# Patient Record
Sex: FEMALE | Race: WHITE | NOT HISPANIC OR LATINO | URBAN - METROPOLITAN AREA
[De-identification: names, ages, dates, MRNs, and addresses within clinical notes are randomized per-mention and may not be internally consistent; named-entity substitution may affect disease eponyms.]

---

## 2022-06-28 RX ORDER — ATORVASTATIN CALCIUM 10 MG/1
TABLET, FILM COATED ORAL
COMMUNITY

## 2022-06-28 RX ORDER — AMITRIPTYLINE HYDROCHLORIDE 25 MG/1
TABLET, FILM COATED ORAL
COMMUNITY

## 2022-06-28 RX ORDER — ESTRADIOL 0.1 MG/G
CREAM VAGINAL
COMMUNITY

## 2023-04-02 LAB
AMORPHOUS CRYSTALS, URINE: NORMAL /HPF
BACTERIA, URINE: ABNORMAL /HPF
BACTERIA, URINE: NORMAL /HPF
BROAD CASTS, URINE: NORMAL /LPF
BUDDING YEAST, URINE: NORMAL /HPF
BUDDING YEAST, URINE: PRESENT /HPF
CALCIUM CARBONATE CRYSTALS, URINE: NORMAL /HPF
CALCIUM OXALATE CRYSTALS, URINE: NORMAL /HPF
CALCIUM PHOSPHATE CRYSTALS, URINE: NORMAL /HPF
CYSTINE CRYSTALS, URINE: NORMAL /HPF
EPITHELIAL CASTS, URINE: NORMAL /LPF
FAT, URINE: NORMAL /HPF
FATTY CASTS, URINE: NORMAL /LPF
FINE GRANULAR CASTS, URINE: NORMAL /LPF
HYALINE CASTS, URINE: ABNORMAL /LPF
HYALINE CASTS, URINE: NORMAL /LPF
LEUCINE CRYSTALS, URINE: NORMAL /HPF
MIXED CELLULAR CASTS, URINE: NORMAL /LPF
MUCUS, URINE: ABNORMAL /LPF
MUCUS, URINE: NORMAL /LPF
OVAL FAT BODIES, URINE: NORMAL /HPF
RBC CASTS, URINE: NORMAL /LPF
RBC CLUMPS, URINE: NORMAL /HPF
RBC, URINE: 2 /HPF (ref 0–5)
RBC, URINE: NORMAL
RENAL EPITHELIAL CELLS, URINE: <1 /HPF
RENAL EPITHELIAL CELLS, URINE: NORMAL /HPF
SPERMATOZOA, URINE: NORMAL /HPF
SQUAMOUS EPITHELIAL CELLS, URINE: <1 /HPF
SQUAMOUS EPITHELIAL CELLS, URINE: NORMAL /HPF
TRANSITIONAL EPITHELIAL CELLS, URINE: NORMAL /HPF
TRICHOMONAS, URINE: NORMAL /HPF
TRIPLE PHOSPHATE CRYSTALS, URINE: NORMAL /HPF
TYROSINE CRYSTALS, URINE: NORMAL /HPF
URIC ACID (URATE) CRYSTALS, URINE: NORMAL /HPF
URINALYSIS MICROSCOPIC COMMENT: NORMAL
WAXY CASTS, URINE: NORMAL /LPF
WBC CASTS, URINE: NORMAL /LPF
WBC CLUMPS, URINE: ABNORMAL /HPF
WBC CLUMPS, URINE: NORMAL /HPF
WBC, URINE: 45 /HPF (ref 0–5)
WBC, URINE: NORMAL
YEAST HYPHAE, URINE: NORMAL /HPF

## 2023-04-05 LAB
BACTERIA, URINE: ABNORMAL /HPF
MUCUS, URINE: ABNORMAL /LPF
RBC, URINE: 1 /HPF (ref 0–5)
SQUAMOUS EPITHELIAL CELLS, URINE: 3 /HPF
URINE CULTURE: ABNORMAL
WBC CLUMPS, URINE: ABNORMAL /HPF
WBC, URINE: 18 /HPF (ref 0–5)

## 2023-04-07 LAB — URINE CULTURE: ABNORMAL

## 2023-06-30 LAB
RBC, URINE: 1 /HPF (ref 0–5)
SQUAMOUS EPITHELIAL CELLS, URINE: 1 /HPF
WBC, URINE: 1 /HPF (ref 0–5)

## 2023-07-01 LAB — URINE CULTURE: NORMAL

## 2023-09-02 LAB
RBC, URINE: <1 /HPF (ref 0–5)
SQUAMOUS EPITHELIAL CELLS, URINE: <1 /HPF
WBC, URINE: <1 /HPF (ref 0–5)

## 2023-09-03 LAB — URINE CULTURE: NORMAL

## 2023-12-18 ENCOUNTER — LAB (OUTPATIENT)
Dept: LAB | Facility: LAB | Age: 73
End: 2023-12-18
Payer: MEDICARE

## 2023-12-18 ENCOUNTER — TELEPHONE (OUTPATIENT)
Dept: UROLOGY | Facility: HOSPITAL | Age: 73
End: 2023-12-18

## 2023-12-18 DIAGNOSIS — N39.0 RECURRENT UTI: Primary | ICD-10-CM

## 2023-12-18 DIAGNOSIS — N39.0 RECURRENT UTI: ICD-10-CM

## 2023-12-18 PROCEDURE — 81003 URINALYSIS AUTO W/O SCOPE: CPT

## 2023-12-19 LAB
APPEARANCE UR: CLEAR
BILIRUB UR STRIP.AUTO-MCNC: NEGATIVE MG/DL
COLOR UR: NORMAL
GLUCOSE UR STRIP.AUTO-MCNC: NEGATIVE MG/DL
HOLD SPECIMEN: NORMAL
KETONES UR STRIP.AUTO-MCNC: NEGATIVE MG/DL
LEUKOCYTE ESTERASE UR QL STRIP.AUTO: NEGATIVE
NITRITE UR QL STRIP.AUTO: NEGATIVE
PH UR STRIP.AUTO: 6 [PH]
PROT UR STRIP.AUTO-MCNC: NEGATIVE MG/DL
RBC # UR STRIP.AUTO: NEGATIVE /UL
SP GR UR STRIP.AUTO: 1
UROBILINOGEN UR STRIP.AUTO-MCNC: <2 MG/DL

## 2023-12-21 DIAGNOSIS — R39.9 UTI SYMPTOMS: Primary | ICD-10-CM

## 2023-12-22 ENCOUNTER — TELEPHONE (OUTPATIENT)
Dept: UROLOGY | Facility: CLINIC | Age: 73
End: 2023-12-22

## 2023-12-22 ENCOUNTER — LAB (OUTPATIENT)
Dept: LAB | Facility: LAB | Age: 73
End: 2023-12-22
Payer: MEDICARE

## 2023-12-22 DIAGNOSIS — N30.00 ACUTE CYSTITIS WITHOUT HEMATURIA: Primary | ICD-10-CM

## 2023-12-22 DIAGNOSIS — R39.9 UTI SYMPTOMS: ICD-10-CM

## 2023-12-22 PROCEDURE — 87086 URINE CULTURE/COLONY COUNT: CPT

## 2023-12-22 RX ORDER — CIPROFLOXACIN 500 MG/1
500 TABLET ORAL 2 TIMES DAILY
Qty: 14 TABLET | Refills: 0 | Status: SHIPPED | OUTPATIENT
Start: 2023-12-22 | End: 2023-12-29

## 2023-12-22 NOTE — TELEPHONE ENCOUNTER
Pt left message stating she dropped off a urine today because she is symptomatic and is requesting 5-7 days of Cipro be sent in as that is what usually works the best for her, thanks,

## 2023-12-23 LAB — BACTERIA UR CULT: NO GROWTH

## 2024-01-11 DIAGNOSIS — N39.0 RECURRENT UTI: Primary | ICD-10-CM

## 2024-02-07 ENCOUNTER — LAB (OUTPATIENT)
Dept: LAB | Facility: LAB | Age: 74
End: 2024-02-07
Payer: MEDICARE

## 2024-02-07 DIAGNOSIS — N39.0 RECURRENT UTI: ICD-10-CM

## 2024-02-07 LAB
APPEARANCE UR: CLEAR
BILIRUB UR STRIP.AUTO-MCNC: NEGATIVE MG/DL
COLOR UR: NORMAL
GLUCOSE UR STRIP.AUTO-MCNC: NORMAL MG/DL
HOLD SPECIMEN: NORMAL
KETONES UR STRIP.AUTO-MCNC: NEGATIVE MG/DL
LEUKOCYTE ESTERASE UR QL STRIP.AUTO: NEGATIVE
NITRITE UR QL STRIP.AUTO: NEGATIVE
PH UR STRIP.AUTO: 5.5 [PH]
PROT UR STRIP.AUTO-MCNC: NEGATIVE MG/DL
RBC # UR STRIP.AUTO: NEGATIVE /UL
SP GR UR STRIP.AUTO: 1.01
UROBILINOGEN UR STRIP.AUTO-MCNC: NORMAL MG/DL

## 2024-02-07 PROCEDURE — 81003 URINALYSIS AUTO W/O SCOPE: CPT

## 2024-02-12 ENCOUNTER — LAB (OUTPATIENT)
Dept: LAB | Facility: LAB | Age: 74
End: 2024-02-12
Payer: MEDICARE

## 2024-02-12 DIAGNOSIS — R30.0 DYSURIA: Primary | ICD-10-CM

## 2024-02-12 DIAGNOSIS — R10.2 PELVIC PAIN: Primary | ICD-10-CM

## 2024-02-12 DIAGNOSIS — R30.0 DYSURIA: ICD-10-CM

## 2024-02-12 LAB
APPEARANCE UR: CLEAR
BILIRUB UR STRIP.AUTO-MCNC: NEGATIVE MG/DL
COLOR UR: COLORLESS
GLUCOSE UR STRIP.AUTO-MCNC: NORMAL MG/DL
HOLD SPECIMEN: NORMAL
KETONES UR STRIP.AUTO-MCNC: NEGATIVE MG/DL
LEUKOCYTE ESTERASE UR QL STRIP.AUTO: NEGATIVE
NITRITE UR QL STRIP.AUTO: NEGATIVE
PH UR STRIP.AUTO: 5 [PH]
PROT UR STRIP.AUTO-MCNC: NEGATIVE MG/DL
RBC # UR STRIP.AUTO: NEGATIVE /UL
SP GR UR STRIP.AUTO: 1.01
UROBILINOGEN UR STRIP.AUTO-MCNC: NORMAL MG/DL

## 2024-02-12 PROCEDURE — 81003 URINALYSIS AUTO W/O SCOPE: CPT

## 2024-02-12 RX ORDER — AMITRIPTYLINE HYDROCHLORIDE 10 MG/1
30 TABLET, FILM COATED ORAL NIGHTLY
Qty: 90 TABLET | Refills: 5 | OUTPATIENT
Start: 2024-02-12 | End: 2024-08-10

## 2024-03-07 ENCOUNTER — LAB (OUTPATIENT)
Dept: LAB | Facility: LAB | Age: 74
End: 2024-03-07
Payer: MEDICARE

## 2024-03-07 DIAGNOSIS — N39.0 RECURRENT UTI: ICD-10-CM

## 2024-03-07 LAB
APPEARANCE UR: CLEAR
BILIRUB UR STRIP.AUTO-MCNC: NEGATIVE MG/DL
COLOR UR: COLORLESS
GLUCOSE UR STRIP.AUTO-MCNC: NORMAL MG/DL
HOLD SPECIMEN: NORMAL
KETONES UR STRIP.AUTO-MCNC: NEGATIVE MG/DL
LEUKOCYTE ESTERASE UR QL STRIP.AUTO: ABNORMAL
MUCOUS THREADS #/AREA URNS AUTO: NORMAL /LPF
NITRITE UR QL STRIP.AUTO: NEGATIVE
PH UR STRIP.AUTO: 5 [PH]
PROT UR STRIP.AUTO-MCNC: NEGATIVE MG/DL
RBC # UR STRIP.AUTO: NEGATIVE /UL
RBC #/AREA URNS AUTO: NORMAL /HPF
SP GR UR STRIP.AUTO: 1.01
SQUAMOUS #/AREA URNS AUTO: NORMAL /HPF
UROBILINOGEN UR STRIP.AUTO-MCNC: NORMAL MG/DL
WBC #/AREA URNS AUTO: NORMAL /HPF

## 2024-03-07 PROCEDURE — 81001 URINALYSIS AUTO W/SCOPE: CPT

## 2024-03-07 PROCEDURE — 87086 URINE CULTURE/COLONY COUNT: CPT

## 2024-03-08 LAB — BACTERIA UR CULT: NO GROWTH

## 2024-07-23 DIAGNOSIS — N30.00 ACUTE CYSTITIS WITHOUT HEMATURIA: Primary | ICD-10-CM

## 2024-07-23 RX ORDER — GRANULES FOR ORAL 3 G/1
3 POWDER ORAL
Qty: 6 G | Refills: 0 | Status: SHIPPED | OUTPATIENT
Start: 2024-07-23 | End: 2024-07-27

## 2024-07-25 ENCOUNTER — APPOINTMENT (OUTPATIENT)
Dept: UROLOGY | Facility: CLINIC | Age: 74
End: 2024-07-25
Payer: MEDICARE

## 2024-08-28 NOTE — PROGRESS NOTES
HISTORY OF PRESENT ILLNESS:  This is a  74 y.o. female, No obstetric history on file. who presents for follow-up for recurrent UTIs.  Patient has had 2 urinary tract infections documented in [place] in April and another 1 in July here at the clinic.  She was on methenamine through breath however she stopped it after having COVID.  Currently she is only taking amitriptyline 1 to 2 tablets at bedtime for her dysuria symptoms.  She has not had urinary retention.  She voids every 2 hours sometimes 3 during the day and she wakes up 1-2 times at night.  She has not been taking d-mannose.      9/1/23 extract from Doris's note  73y  female with history of urinary retention and recurrent UTIs managed with Elavil and PFPT presents today for an evaluation of chronic UTIs. Renewed standing culture order. Continue with amitriptyline and Azo as needed as well as estradiol. Recommended restarting d-mannose 2 g p.o. daily in an effort for further prevention. Patient continues to develop infections, 1 more culture proven by April 2024, I would recommend initiation of daily antibiotics for 6 months. Patient verbalized understanding of plan and is in agreement. Plan for 6-month follow-up or sooner if needed.           PHYSICAL EXAMINATION:  No LMP recorded.  There is no height or weight on file to calculate BMI.  There were no vitals taken for this visit.  General Appearance: well appearing  Neuro: Alert and oriented     Urine dip: No results found for this or any previous visit (from the past 6 hour(s)).  Results for orders placed or performed in visit on 08/29/24 (from the past 96 hour(s))   POCT UA Automated manually resulted   Result Value Ref Range    POC Color, Urine Yellow Straw, Yellow, Light-Yellow    POC Appearance, Urine Clear Clear    POC Glucose, Urine NEGATIVE NEGATIVE mg/dl    POC Bilirubin, Urine NEGATIVE NEGATIVE    POC Ketones, Urine NEGATIVE NEGATIVE mg/dl    POC Specific Gravity, Urine 1.010 1.005 -  1.035    POC Blood, Urine NEGATIVE NEGATIVE    POC PH, Urine 5.5 No Reference Range Established PH    POC Protein, Urine NEGATIVE NEGATIVE, 30 (1+) mg/dl    POC Urobilinogen, Urine 0.2 0.2, 1.0 EU/DL    Poc Nitrite, Urine NEGATIVE NEGATIVE    POC Leukocytes, Urine NEGATIVE NEGATIVE        IMPRESSION AND PLAN:  Sherly Tamayo is a 74 y.o. No obstetric history on file. who presents with recurrent UTIs.  She had 2 interval urinary tract infections since [facility] of last year.  She was on methenamine however this was stopped after she developed COVID.  She has not been taking d-mannose.  We did talk today about resuming those suppressive therapies for prevention of infection.  We will place a standing order for urine culture in her system.  She will have a refill on her amitriptyline.  Patient will follow-up with us in 6 months.      Scribe Attestation  By signing my name below, Alpa GARCIA, Awildaibshannon   attest that this documentation has been prepared under the direction and in the presence of Abdon Rojas MD.    Time IN:  Time OUT:

## 2024-08-29 ENCOUNTER — APPOINTMENT (OUTPATIENT)
Dept: UROLOGY | Facility: CLINIC | Age: 74
End: 2024-08-29
Payer: MEDICARE

## 2024-08-29 VITALS
HEIGHT: 65 IN | DIASTOLIC BLOOD PRESSURE: 81 MMHG | SYSTOLIC BLOOD PRESSURE: 157 MMHG | BODY MASS INDEX: 24.16 KG/M2 | HEART RATE: 86 BPM | WEIGHT: 145 LBS | TEMPERATURE: 97.3 F

## 2024-08-29 DIAGNOSIS — N39.0 RECURRENT UTI: ICD-10-CM

## 2024-08-29 DIAGNOSIS — R10.2 PELVIC PAIN: ICD-10-CM

## 2024-08-29 LAB
POC APPEARANCE, URINE: CLEAR
POC BILIRUBIN, URINE: NEGATIVE
POC BLOOD, URINE: NEGATIVE
POC COLOR, URINE: YELLOW
POC GLUCOSE, URINE: NEGATIVE MG/DL
POC KETONES, URINE: NEGATIVE MG/DL
POC LEUKOCYTES, URINE: NEGATIVE
POC NITRITE,URINE: NEGATIVE
POC PH, URINE: 5.5 PH
POC PROTEIN, URINE: NEGATIVE MG/DL
POC SPECIFIC GRAVITY, URINE: 1.01
POC UROBILINOGEN, URINE: 0.2 EU/DL

## 2024-08-29 PROCEDURE — G2211 COMPLEX E/M VISIT ADD ON: HCPCS | Performed by: UROLOGY

## 2024-08-29 PROCEDURE — 1159F MED LIST DOCD IN RCRD: CPT | Performed by: UROLOGY

## 2024-08-29 PROCEDURE — 99213 OFFICE O/P EST LOW 20 MIN: CPT | Performed by: UROLOGY

## 2024-08-29 PROCEDURE — 81003 URINALYSIS AUTO W/O SCOPE: CPT | Performed by: UROLOGY

## 2024-08-29 PROCEDURE — 3008F BODY MASS INDEX DOCD: CPT | Performed by: UROLOGY

## 2024-08-29 RX ORDER — LEVOTHYROXINE SODIUM 50 UG/1
50 TABLET ORAL
COMMUNITY
Start: 2023-10-03

## 2024-08-29 RX ORDER — CEPHALEXIN 500 MG/1
1 CAPSULE ORAL
COMMUNITY
Start: 2023-10-31

## 2024-08-29 RX ORDER — METHENAMINE HIPPURATE 1000 MG/1
1 TABLET ORAL 2 TIMES DAILY
Qty: 60 TABLET | Refills: 11 | Status: SHIPPED | OUTPATIENT
Start: 2024-08-29 | End: 2025-08-24

## 2024-08-29 RX ORDER — SOLIFENACIN SUCCINATE 10 MG/1
TABLET, FILM COATED ORAL EVERY 24 HOURS
COMMUNITY

## 2024-08-29 RX ORDER — AMITRIPTYLINE HYDROCHLORIDE 10 MG/1
30 TABLET, FILM COATED ORAL NIGHTLY
Qty: 90 TABLET | Refills: 5 | Status: SHIPPED | OUTPATIENT
Start: 2024-08-29 | End: 2025-02-25

## 2024-08-29 RX ORDER — ESTRADIOL 0.1 MG/G
2 CREAM VAGINAL DAILY
COMMUNITY

## 2024-08-29 RX ORDER — VIT C/E/ZN/COPPR/LUTEIN/ZEAXAN 250MG-90MG
CAPSULE ORAL
COMMUNITY
Start: 2015-07-07

## 2024-08-29 RX ORDER — ATORVASTATIN CALCIUM 10 MG/1
TABLET, FILM COATED ORAL
COMMUNITY

## 2024-09-09 DIAGNOSIS — N39.0 RECURRENT UTI: ICD-10-CM

## 2024-09-12 ENCOUNTER — LAB (OUTPATIENT)
Dept: LAB | Facility: LAB | Age: 74
End: 2024-09-12
Payer: MEDICARE

## 2024-09-12 DIAGNOSIS — N39.0 RECURRENT UTI: ICD-10-CM

## 2024-09-12 LAB
APPEARANCE UR: ABNORMAL
BACTERIA #/AREA URNS AUTO: ABNORMAL /HPF
BILIRUB UR STRIP.AUTO-MCNC: NEGATIVE MG/DL
COLOR UR: ABNORMAL
GLUCOSE UR STRIP.AUTO-MCNC: NORMAL MG/DL
HOLD SPECIMEN: NORMAL
KETONES UR STRIP.AUTO-MCNC: NEGATIVE MG/DL
LEUKOCYTE ESTERASE UR QL STRIP.AUTO: ABNORMAL
NITRITE UR QL STRIP.AUTO: ABNORMAL
PH UR STRIP.AUTO: 5.5 [PH]
PROT UR STRIP.AUTO-MCNC: NEGATIVE MG/DL
RBC # UR STRIP.AUTO: ABNORMAL /UL
RBC #/AREA URNS AUTO: ABNORMAL /HPF
SP GR UR STRIP.AUTO: 1.01
UROBILINOGEN UR STRIP.AUTO-MCNC: NORMAL MG/DL
WBC #/AREA URNS AUTO: >50 /HPF
WBC CLUMPS #/AREA URNS AUTO: ABNORMAL /HPF

## 2024-09-12 PROCEDURE — 87086 URINE CULTURE/COLONY COUNT: CPT

## 2024-09-12 PROCEDURE — 87186 SC STD MICRODIL/AGAR DIL: CPT

## 2024-09-12 PROCEDURE — 81001 URINALYSIS AUTO W/SCOPE: CPT

## 2024-09-13 ENCOUNTER — TELEPHONE (OUTPATIENT)
Dept: UROLOGY | Facility: CLINIC | Age: 74
End: 2024-09-13
Payer: MEDICARE

## 2024-09-13 DIAGNOSIS — R30.0 DYSURIA: ICD-10-CM

## 2024-09-13 DIAGNOSIS — R39.15 URINARY URGENCY: Primary | ICD-10-CM

## 2024-09-13 RX ORDER — CEPHALEXIN 500 MG/1
500 CAPSULE ORAL 2 TIMES DAILY
Qty: 10 CAPSULE | Refills: 0 | Status: SHIPPED | OUTPATIENT
Start: 2024-09-13 | End: 2024-09-18

## 2024-09-13 NOTE — TELEPHONE ENCOUNTER
Pt states she submitted urine sample yesterday for suspected UTI and would like antibiotics called in for her. She states she does not want to go into the weekend without some sort of relief

## 2024-09-13 NOTE — TELEPHONE ENCOUNTER
Called and spoke to patient. Name and  verified. Patient reports she has dysuria, urinary urgency, and pressure since Wednesday. Urine culture in process. UA with WBCs. Denies fevers, chills, nausea or vomiting. She is taking Uribel which helps. Not taking methenamine, waiting to her from liver doctor to tell her if OK to take.   Discussed would like to wait until urine culture is finalized before treating but can consider expectant management going into the weekend. She would like to be treated. She is aware antibiotic may need changed based on sensitives. Allergies to Bactrim and Macrobid. Will empirically treat with Keflex which patient reports she has taken before.     24 creatinine 1.31. Calculated creatinine clearance of 40mL/min. No dosage adjustment necessary for Keflex given CrCl >30. Will prescribe Keflex 500mg po bid x 5 days. Sent to pharmacy.    ED precautions reviewed. Doris to follow up with patient once urine culture finalized. Patient thankful for the call and in agreement with plan.     Richa Cid PA-C  24 12:08 PM  Clinic phone: [phone], [phone]

## 2024-09-15 DIAGNOSIS — N39.0 RECURRENT UTI: Primary | ICD-10-CM

## 2024-09-15 LAB — BACTERIA UR CULT: ABNORMAL

## 2024-09-15 RX ORDER — GRANULES FOR ORAL 3 G/1
3 POWDER ORAL
Qty: 9 G | Refills: 0 | Status: SHIPPED | OUTPATIENT
Start: 2024-09-15 | End: 2024-09-22

## 2024-09-16 DIAGNOSIS — N39.0 RECURRENT UTI: ICD-10-CM

## 2024-09-16 RX ORDER — AMITRIPTYLINE HYDROCHLORIDE 10 MG/1
30 TABLET, FILM COATED ORAL NIGHTLY
Qty: 270 TABLET | Refills: 3 | Status: SHIPPED | OUTPATIENT
Start: 2024-09-16 | End: 2025-09-16

## 2024-09-16 RX ORDER — METHENAMINE HIPPURATE 1000 MG/1
1 TABLET ORAL 2 TIMES DAILY
Qty: 180 TABLET | Refills: 3 | Status: SHIPPED | OUTPATIENT
Start: 2024-09-16 | End: 2025-09-16

## 2024-09-29 ENCOUNTER — ANCILLARY PROCEDURE (OUTPATIENT)
Dept: URGENT CARE | Age: 74
End: 2024-09-29
Payer: MEDICARE

## 2024-09-29 ENCOUNTER — OFFICE VISIT (OUTPATIENT)
Dept: URGENT CARE | Age: 74
End: 2024-09-29
Payer: MEDICARE

## 2024-09-29 VITALS
DIASTOLIC BLOOD PRESSURE: 81 MMHG | SYSTOLIC BLOOD PRESSURE: 150 MMHG | HEIGHT: 65 IN | BODY MASS INDEX: 24.16 KG/M2 | WEIGHT: 145 LBS | OXYGEN SATURATION: 97 % | TEMPERATURE: 97.8 F | HEART RATE: 96 BPM | RESPIRATION RATE: 18 BRPM

## 2024-09-29 DIAGNOSIS — R30.9 PAINFUL URINATION: Primary | ICD-10-CM

## 2024-09-29 DIAGNOSIS — S69.92XA LEFT WRIST INJURY, INITIAL ENCOUNTER: ICD-10-CM

## 2024-09-29 LAB
POC APPEARANCE, URINE: CLEAR
POC BILIRUBIN, URINE: NEGATIVE
POC BLOOD, URINE: NEGATIVE
POC COLOR, URINE: ABNORMAL
POC GLUCOSE, URINE: NEGATIVE MG/DL
POC KETONES, URINE: NEGATIVE MG/DL
POC LEUKOCYTES, URINE: NEGATIVE
POC NITRITE,URINE: POSITIVE
POC PH, URINE: 6 PH
POC PROTEIN, URINE: NEGATIVE MG/DL
POC SPECIFIC GRAVITY, URINE: 1.01
POC UROBILINOGEN, URINE: 0.2 EU/DL

## 2024-09-29 PROCEDURE — 99203 OFFICE O/P NEW LOW 30 MIN: CPT | Performed by: NURSE PRACTITIONER

## 2024-09-29 PROCEDURE — 1159F MED LIST DOCD IN RCRD: CPT | Performed by: NURSE PRACTITIONER

## 2024-09-29 PROCEDURE — 81003 URINALYSIS AUTO W/O SCOPE: CPT | Performed by: NURSE PRACTITIONER

## 2024-09-29 PROCEDURE — 87086 URINE CULTURE/COLONY COUNT: CPT

## 2024-09-29 PROCEDURE — 3008F BODY MASS INDEX DOCD: CPT | Performed by: NURSE PRACTITIONER

## 2024-09-29 RX ORDER — CIPROFLOXACIN 500 MG/1
500 TABLET ORAL 2 TIMES DAILY
Qty: 10 TABLET | Refills: 0 | Status: SHIPPED | OUTPATIENT
Start: 2024-09-29 | End: 2024-10-04

## 2024-09-29 ASSESSMENT — ENCOUNTER SYMPTOMS: DYSURIA: 1

## 2024-09-29 NOTE — PROGRESS NOTES
Subjective   Patient ID: Sherly Tamayo is a 74 y.o. female. They present today with a chief complaint of Difficulty Urinating (Pt c/o increased frequency urinating, burning, bladder pressure x 2 days. Hx of uti's, has appt with infect. Disease doctor in December.).    History of Present Illness  Pt has an extensive PMH for uti  She will seeing ID for the urinary infections  She states that she has to have a cx and she also is prescribed cipro 500mg for the UTI  Denies fevers or flank pain and no hematuria      Difficulty Urinating      Past Medical History  Allergies as of 09/29/2024 - Reviewed 09/29/2024   Allergen Reaction Noted    Sulfamethoxazole-trimethoprim Unknown and Shortness of breath 04/23/2024    Allopurinol Fever, Unknown, Other, and Hives 06/01/2004    Sulfa (sulfonamide antibiotics) Rash 06/01/2004    Sulfacetamide sod-sulfur-urea Unknown 08/29/2024    Nitrofurantoin monohyd/m-cryst Unknown 05/12/2011       (Not in a hospital admission)       Past Medical History:   Diagnosis Date    Personal history of malignant neoplasm of breast 12/19/2014    History of malignant neoplasm of breast    Personal history of malignant neoplasm, unspecified     History of malignant neoplasm    Personal history of other diseases of the respiratory system     History of bronchitis    Personal history of other diseases of urinary system     History of bladder problems    Personal history of other specified conditions     History of shortness of breath    Pure hypercholesterolemia, unspecified     High cholesterol       Past Surgical History:   Procedure Laterality Date    CARPAL TUNNEL RELEASE  03/14/2017    Neuroplasty Decompression Median Nerve At Carpal Tunnel    MASTECTOMY  12/19/2014    Breast Surgery Mastectomy            Review of Systems  Review of Systems   Genitourinary:  Positive for dysuria.   All other systems reviewed and are negative.                                 Objective    Vitals:    09/29/24 0859   BP:  "150/81   BP Location: Left arm   Patient Position: Sitting   BP Cuff Size: Adult   Pulse: 96   Resp: 18   Temp: 36.6 °C (97.8 °F)   TempSrc: Oral   SpO2: 97%   Weight: 65.8 kg (145 lb)   Height: 1.651 m (5' 5\")     No LMP recorded.    Physical Exam  Vitals reviewed.   Constitutional:       Appearance: Normal appearance.   Cardiovascular:      Rate and Rhythm: Normal rate and regular rhythm.      Heart sounds: Normal heart sounds.   Pulmonary:      Effort: Pulmonary effort is normal.      Breath sounds: Normal breath sounds.   Abdominal:      Tenderness: There is no right CVA tenderness or left CVA tenderness.   Neurological:      Mental Status: She is alert.         Procedures    Point of Care Test & Imaging Results from this visit  Results for orders placed or performed in visit on 09/29/24   POCT UA Automated manually resulted   Result Value Ref Range    POC Color, Urine Orange (A) Straw, Yellow, Light-Yellow    POC Appearance, Urine Clear Clear    POC Glucose, Urine NEGATIVE NEGATIVE mg/dl    POC Bilirubin, Urine NEGATIVE NEGATIVE    POC Ketones, Urine NEGATIVE NEGATIVE mg/dl    POC Specific Gravity, Urine 1.010 1.005 - 1.035    POC Blood, Urine NEGATIVE NEGATIVE    POC PH, Urine 6.0 No Reference Range Established PH    POC Protein, Urine NEGATIVE NEGATIVE, 30 (1+) mg/dl    POC Urobilinogen, Urine 0.2 0.2, 1.0 EU/DL    Poc Nitrite, Urine POSITIVE (A) NEGATIVE    POC Leukocytes, Urine NEGATIVE NEGATIVE      No results found.    Diagnostic study results (if any) were reviewed by Lindsey Way.    Assessment/Plan   Allergies, medications, history, and pertinent labs/EKGs/Imaging reviewed by Lindsey Way.     Medical Decision Making  Encounter Diagnosis   Name Primary?    Painful urination Yes     Start cipro  Will send urine cx  Reviewed the black box warning of cipro with the pt  Will call if need to change the antibiotic  If pain increases, ainsley red blood in the urine or fevers = to the ED      Orders " and Diagnoses  Diagnoses and all orders for this visit:  Painful urination  -     POCT UA Automated manually resulted      Medical Admin Record      Patient disposition: Home    Electronically signed by Lindsey Way  9:16 AM

## 2024-09-30 DIAGNOSIS — N39.0 RECURRENT UTI: Primary | ICD-10-CM

## 2024-10-01 DIAGNOSIS — N39.0 RECURRENT UTI: ICD-10-CM

## 2024-10-01 LAB — BACTERIA UR CULT: NO GROWTH

## 2024-10-01 RX ORDER — AMITRIPTYLINE HYDROCHLORIDE 10 MG/1
40 TABLET, FILM COATED ORAL NIGHTLY
Qty: 360 TABLET | Refills: 3 | Status: SHIPPED | OUTPATIENT
Start: 2024-10-01 | End: 2025-10-01

## 2024-12-10 ENCOUNTER — LAB (OUTPATIENT)
Dept: LAB | Facility: LAB | Age: 74
End: 2024-12-10
Payer: MEDICARE

## 2024-12-10 DIAGNOSIS — N39.0 RECURRENT UTI: ICD-10-CM

## 2024-12-10 LAB
APPEARANCE UR: CLEAR
BILIRUB UR STRIP.AUTO-MCNC: ABNORMAL MG/DL
COLOR UR: ABNORMAL
GLUCOSE UR STRIP.AUTO-MCNC: NORMAL MG/DL
KETONES UR STRIP.AUTO-MCNC: NEGATIVE MG/DL
LEUKOCYTE ESTERASE UR QL STRIP.AUTO: NEGATIVE
NITRITE UR QL STRIP.AUTO: ABNORMAL
PH UR STRIP.AUTO: 6.5 [PH]
PROT UR STRIP.AUTO-MCNC: NEGATIVE MG/DL
RBC # UR STRIP.AUTO: NEGATIVE /UL
RBC #/AREA URNS AUTO: NORMAL /HPF
SP GR UR STRIP.AUTO: 1.02
SQUAMOUS #/AREA URNS AUTO: NORMAL /HPF
UROBILINOGEN UR STRIP.AUTO-MCNC: ABNORMAL MG/DL
WBC #/AREA URNS AUTO: NORMAL /HPF

## 2024-12-10 PROCEDURE — 81001 URINALYSIS AUTO W/SCOPE: CPT

## 2024-12-10 PROCEDURE — 87086 URINE CULTURE/COLONY COUNT: CPT

## 2024-12-11 LAB
BACTERIA UR CULT: NORMAL
HOLD SPECIMEN: NORMAL

## 2024-12-16 NOTE — PROGRESS NOTES
"[facility] Infectious Diseases Consultation Note    Date of Consultation/Follow-up: 12/16/2024  Referred by: TONNY Shell-CNP  Primary MD: Enrique Jay MD    Reason For Consult: Recurrent UTI    History Of Present Illness  Sherly Tamayo is a 74 y.o. female with CLL s/p FCR 2002 with no evidence of recurrence, remote breast CA, antibiotic intolerances/allergies to Bactrim and macrobid, urinary retention managed with amitriptyline and pelvic floor therapy c/b recurrent UTIs on methenamine presenting for evaluation of recurrent UTI.    Over the past several years she has grown a variety of bacteria from her urine (most recently E coli R to CTX, cipro; S macrobid and bactrim; treated with fosfomycin x3 doses). She most recently had an urgent care visit 12/10/24 for dysuria, increased frequency, and bladder pressure. UA without pyuria and Ucx ultimately negative; received a course of ciprofloxacin.    UTI History:  She reports having frequent UTIs for around 20 years. She estimates around 5 UTIs per year. She notes multiple occasions where she would have UTI symptoms without positive urine cultures that did seem to resolve without antibiotic therapy.She has had  a cystoscopy and UDS done but has been several years ago; last CT A/P 10/2022 without hydronephrosis. She notes she typically develops a \"tightness\" of her bladder along with sensation of incomplete bladder emptying and occasional dysuria. She usually drinks around 40-45oz water per day. No history of flank pain, hematuria that she recalls. She started methenamine around 9/2024.    She has a history of diffuse erythrodermatous rash to Bactrim/sulfa drugs around 20 years ago, as well as sensation of limited ability to breath with macrobid exposure.    Social  -Born in [place], lives in [place]  -Lives in [place] and [place] for part of the year  -Retired, homemaker for many years  -Children in [place] and [place] with grandchildren    Past " Medical History  She has a past medical history of Personal history of malignant neoplasm of breast (12/19/2014), Personal history of malignant neoplasm, unspecified, Personal history of other diseases of the respiratory system, Personal history of other diseases of urinary system, Personal history of other specified conditions, and Pure hypercholesterolemia, unspecified.    Surgical History  She has a past surgical history that includes Mastectomy (12/19/2014) and Carpal tunnel release (03/14/2017).     Social History     Occupational History    Not on file   Tobacco Use    Smoking status: Not on file    Smokeless tobacco: Not on file   Substance and Sexual Activity    Alcohol use: Not on file    Drug use: Not on file    Sexual activity: Not on file     Travel History   Travel since 11/16/24        Location Start Date End Date     [place] 10/05/24 12/10/24               Family History  No family history on file.  Allergies  Sulfamethoxazole-trimethoprim, Allopurinol, Sulfa (sulfonamide antibiotics), Sulfacetamide sod-sulfur-urea, and Nitrofurantoin monohyd/m-cryst     Immunization History   Administered Date(s) Administered    Pfizer COVID-19 vaccine, 12 years and older, (30mcg/0.3mL) (Comirnaty) 12/13/2023, 09/13/2024     Medications  Current Outpatient Medications on File Prior to Visit   Medication Sig Dispense Refill    amitriptyline (Elavil) 10 mg tablet Take 4 tablets (40 mg) by mouth once daily at bedtime. 360 tablet 3    atorvastatin (Lipitor) 20 mg tablet Take 1 tablet (20 mg) by mouth once daily.      cholecalciferol (Vitamin D-3) 25 MCG (1000 UT) capsule Take by mouth.      estradiol (Estrace) 0.01 % (0.1 mg/gram) vaginal cream Insert 0.5 Applicatorfuls (2 g) into the vagina once daily. (Patient taking differently: Insert 0.5 Applicatorfuls (2 g) into the vagina 2 times a week. Twice weekly)      levothyroxine (Synthroid, Levoxyl) 50 mcg tablet Take 1 tablet (50 mcg) by  "mouth once daily.      methenamine hippurate (Hiprex) 1 gram tablet Take 1 tablet (1 g) by mouth 2 times a day. 180 tablet 3    [DISCONTINUED] atorvastatin (Lipitor) 10 mg tablet TAKE 1 TABLET BY MOUTH EVERY DAY BEFORE BEDTIME      [DISCONTINUED] solifenacin (Vesicare) 10 mg tablet once every 24 hours.       No current facility-administered medications on file prior to visit.       Review of Systems     Objective  Range of Vitals (last 24 hours)  [unfilled]  Daily Weight  09/29/24 : 65.8 kg (145 lb)    There is no height or weight on file to calculate BMI.     Physical Exam  GENERAL APPEARANCE: Awake and alert and not in any distress  HEENT: Atraumatic and normocephalic. Wears glasses. PERRL, EOM. No OP erythema/ulcerations  THROAT: No ulcers or thrush  NECK: Supple  CARDIAC: Regular, appropriate rate. No murmurs  LUNGS: Clear bilaterally  ABDOMEN: Soft and nontender. No SP tenderness. No CVA tenderness  MUSCULOSKELETAL: No swelling of major joints  EXTREMITIES: No edema  NEUROLOGICAL: Well oriented and answers appropriately  SKIN: No rash or ulcers    Relevant Results  Labs              CrCl cannot be calculated (No successful lab value found.).  No results found for: \"CRP\", \"SEDRATE\"    Microbiology  -12/10/24 Ucx: NG  -09/29/24 Ucx: NG  -09/12/24 Ucx: E coli (R CTX; S macrobid, Bactrim, ertapenem/meropenem)  -03/07/24 Ucx: negative  -12/22/23 Ucx: NG    Assessment/Plan  Sherly Tamayo is a 74 y.o. female with CLL s/p FCR 2002 with no evidence of recurrence, remote breast CA, antibiotic intolerances/allergies to Bactrim and macrobid, urinary retention managed with amitriptyline and pelvic floor therapy c/b recurrent UTIs on methenamine presenting for evaluation of recurrent UTI.    Over the past several years she has grown a variety of bacteria from her urine but most recently has been ESBL E coli (most recently E coli R to CTX, cipro; S macrobid and bactrim; treated with fosfomycin x3 doses). We have limited PO " treatment and/or suppressive options (essentially fosfomycin at this point; allergies/intolerances to Bactrim and macrobid noted).    Plan  -Following with Urology colleagues regarding whether further evaluation for anatomical reasons (ex. Cystoscopy, UDS, etc) indicated at this point. Sounds that she had this work-up in the past few years though so may not be indicated  -Will trial D-mannose therapy given recurrent E coli history  -Continue methenamine and reviewed vitamin C therapy  -Reviewed importance of hydration up to 2-3L daily, particularly when she feels that urinary symptoms are developing  -Reviewed importance of urine testing/culture with urinary symptoms given history of negative cultures in past and some episodes that might have responded without antibiotic therapy  -She will contact us if she develops UTI symptoms for testing. Remaining PO options might be limited to fosfomycin unfortunately (tetracyclines would be another class to consider though poor urinary tract excretion; might at least achieve adequate urine concentrations if susceptible for prophylaxis, should we need it)  -Follow-up when returns to [place] from [place]. Can consider repeat CT scan versus renal ultrasound at that time and reassess if trial of prophylaxis indicated    I spent 53 minutes in the care of this patient, including chart review, patient interview/exam, and documentation.    Charly Jose MD

## 2024-12-17 ENCOUNTER — APPOINTMENT (OUTPATIENT)
Dept: INFECTIOUS DISEASES | Facility: CLINIC | Age: 74
End: 2024-12-17
Payer: MEDICARE

## 2024-12-17 VITALS
WEIGHT: 145 LBS | TEMPERATURE: 97.8 F | SYSTOLIC BLOOD PRESSURE: 151 MMHG | DIASTOLIC BLOOD PRESSURE: 80 MMHG | BODY MASS INDEX: 24.16 KG/M2 | HEIGHT: 65 IN | HEART RATE: 80 BPM

## 2024-12-17 DIAGNOSIS — N39.0 RECURRENT UTI: ICD-10-CM

## 2024-12-17 PROCEDURE — 3008F BODY MASS INDEX DOCD: CPT | Performed by: STUDENT IN AN ORGANIZED HEALTH CARE EDUCATION/TRAINING PROGRAM

## 2024-12-17 PROCEDURE — 1036F TOBACCO NON-USER: CPT | Performed by: STUDENT IN AN ORGANIZED HEALTH CARE EDUCATION/TRAINING PROGRAM

## 2024-12-17 PROCEDURE — 1159F MED LIST DOCD IN RCRD: CPT | Performed by: STUDENT IN AN ORGANIZED HEALTH CARE EDUCATION/TRAINING PROGRAM

## 2024-12-17 PROCEDURE — 99204 OFFICE O/P NEW MOD 45 MIN: CPT | Performed by: STUDENT IN AN ORGANIZED HEALTH CARE EDUCATION/TRAINING PROGRAM

## 2024-12-17 RX ORDER — ATORVASTATIN CALCIUM 20 MG/1
20 TABLET, FILM COATED ORAL DAILY
COMMUNITY

## 2024-12-17 NOTE — PATIENT INSTRUCTIONS
"Today we met to discuss your recurrent UTIs. UTIs are often caused by interruption of urine from the kidneys down the bladder and out of the urethra. We will see if the Urology team has any other thoughts or work-up needed for any structural causes of UTI. We can try the following in the meantime:  -Continue methenamine and can add vitamin C 500-1000mg daily to help acidify the urine  -D-mannose OTC  -Cranberry juices in particular rather than cranberry \"drink\"  -Hydration up to 2-3L of water per day, particularly if you feel a UTI coming on  We have limited by mouth antibiotic options to prevent UTIs between the resistance patterns of the E coli as well as some of your allergies. We can discuss however one antibiotic (fosfomycin) as a preventative option if needed.    Please call if any questions or concerns.  Office phone: [phone]  "

## 2024-12-17 NOTE — LETTER
12/17/24    Cheyenne Robin, TONNY-CNP  [address]  [place] [address]      Dear TONNY Briceno-CNP,    Thank you for referring your patient, Sherly Tamayo, to receive care in my office. I have enclosed a summary of the care provided to Sherly on 12/17/24.    Please contact me with any questions you may have regarding the visit.    Sincerely,         Charly Jose MD  [address]  [facility] [address]    CC: No Recipients

## 2024-12-17 NOTE — LETTER
12/17/24    Enrique Jay MD  [address] [address]      Dear Dr. Enrique Jay MD,    I am writing to confirm that your patient, Sherly aTmayo, received care in my office on 12/17/24. I have enclosed a summary of the care provided to Sherly for your reference.    Please contact me with any questions you may have regarding the visit.    Sincerely,         Charly Jose MD  [address]  [facility] [address]  [facility] [address]    CC: No Recipients

## 2024-12-31 ENCOUNTER — LAB (OUTPATIENT)
Dept: LAB | Facility: LAB | Age: 74
End: 2024-12-31
Payer: MEDICARE

## 2024-12-31 DIAGNOSIS — N39.0 RECURRENT UTI: ICD-10-CM

## 2024-12-31 LAB
APPEARANCE UR: CLEAR
BILIRUB UR STRIP.AUTO-MCNC: NEGATIVE MG/DL
COLOR UR: COLORLESS
GLUCOSE UR STRIP.AUTO-MCNC: NORMAL MG/DL
KETONES UR STRIP.AUTO-MCNC: NEGATIVE MG/DL
LEUKOCYTE ESTERASE UR QL STRIP.AUTO: NEGATIVE
NITRITE UR QL STRIP.AUTO: NEGATIVE
PH UR STRIP.AUTO: 5.5 [PH]
PROT UR STRIP.AUTO-MCNC: NEGATIVE MG/DL
RBC # UR STRIP.AUTO: NEGATIVE /UL
SP GR UR STRIP.AUTO: 1
UROBILINOGEN UR STRIP.AUTO-MCNC: NORMAL MG/DL

## 2024-12-31 PROCEDURE — 81003 URINALYSIS AUTO W/O SCOPE: CPT

## 2025-01-01 LAB — HOLD SPECIMEN: NORMAL

## 2025-06-22 LAB
APPEARANCE UR: CLEAR
BACTERIA #/AREA URNS HPF: ABNORMAL /HPF
BACTERIA UR CULT: ABNORMAL
BACTERIA UR CULT: ABNORMAL
BILIRUB UR QL STRIP: NEGATIVE
COLOR UR: ABNORMAL
GLUCOSE UR QL STRIP: NEGATIVE
HGB UR QL STRIP: NEGATIVE
HYALINE CASTS #/AREA URNS LPF: ABNORMAL /LPF
KETONES UR QL STRIP: NEGATIVE
LEUKOCYTE ESTERASE UR QL STRIP: ABNORMAL
NITRITE UR QL STRIP: POSITIVE
PH UR STRIP: ABNORMAL [PH] (ref 5–8)
PROT UR QL STRIP: NEGATIVE
RBC #/AREA URNS HPF: ABNORMAL /HPF
SERVICE CMNT-IMP: ABNORMAL
SP GR UR STRIP: 1.01 (ref 1–1.03)
SQUAMOUS #/AREA URNS HPF: ABNORMAL /HPF
WBC #/AREA URNS HPF: ABNORMAL /HPF

## 2025-06-25 ENCOUNTER — PATIENT MESSAGE (OUTPATIENT)
Dept: UROLOGY | Facility: CLINIC | Age: 75
End: 2025-06-25
Payer: MEDICARE

## 2025-07-03 LAB
APPEARANCE UR: CLEAR
BACTERIA #/AREA URNS HPF: ABNORMAL /HPF
BACTERIA UR CULT: ABNORMAL
BILIRUB UR QL STRIP: NEGATIVE
COLOR UR: YELLOW
GLUCOSE UR QL STRIP: NEGATIVE
HGB UR QL STRIP: NEGATIVE
HYALINE CASTS #/AREA URNS LPF: ABNORMAL /LPF
KETONES UR QL STRIP: NEGATIVE
LEUKOCYTE ESTERASE UR QL STRIP: NEGATIVE
NITRITE UR QL STRIP: NEGATIVE
PH UR STRIP: ABNORMAL [PH] (ref 5–8)
PROT UR QL STRIP: NEGATIVE
RBC #/AREA URNS HPF: ABNORMAL /HPF
SERVICE CMNT-IMP: ABNORMAL
SP GR UR STRIP: 1.01 (ref 1–1.03)
SQUAMOUS #/AREA URNS HPF: ABNORMAL /HPF
WBC #/AREA URNS HPF: ABNORMAL /HPF